# Patient Record
(demographics unavailable — no encounter records)

---

## 2025-01-13 NOTE — ASSESSMENT
[FreeTextEntry1] : Impression: History of iron deficiency anemia with no GI etiology found after extensive workup.  No evidence of iron deficiency currently, states he never took iron supplements.  No GI complaints.  Average risk colorectal cancer.  10-year follow-up for colonoscopy recommended.  Plan: Repeat colonoscopy 2032.

## 2025-01-13 NOTE — HISTORY OF PRESENT ILLNESS
[FreeTextEntry1] : 65-year-old male was seen 3 years ago by Dr. Palomino for iron deficiency.  GI workup including EGD, colonoscopy, small bowel capsule, CAT scan all negative.  Patient states he never took iron supplements.  Recent CBC and iron studies normal.  No GI complaints.  Bowel movements normal.

## 2025-05-15 NOTE — HISTORY OF PRESENT ILLNESS
[FreeTextEntry1] : He is a 65 year-old man who was seen today for ED and distal urethral stricture after TURP.  Nocturia is minimal.  Urinary flow strong.  He self dilates the distal urethra every now and then.  Residual urine today was less than 50 mL.  E. coli UTI was treated in 2024.  In November 2024 PSA was 1.59.  He is using sildenafil 60 mg with good results.    Previous notes: He had developed a distal urethral stricture after undergoing TURP in 2019.  He saw one of our reconstructive surgeons for consideration of repair but he decided to wait.  He had history of slow and interrupted urinary stream, nocturia 2 times and sensation of incomplete bladder emptying. He did not respond to Flomax. He has undergone urodynamics study.

## 2025-05-15 NOTE — ASSESSMENT
[FreeTextEntry1] : He will dilate the distal ureter if needed and when his symptoms change.  He empties bladder well.  PSA level was normal.  UTI previously was treated.  Sildenafil was refilled.  He can follow-up in 6 months to 1 year.  Fito Victoria MD, [facility]  of Urology [address], [address] [place] [address] Tel: [phone] Fax: [phone]

## 2025-05-15 NOTE — PHYSICAL EXAM
[Urethral Meatus] : meatus normal [Penis Abnormality] : normal uncircumcised penis [Urinary Bladder Findings] : the bladder was normal on palpation [Scrotum] : the scrotum was normal [Testes Tenderness] : no tenderness of the testes [Testes Mass (___cm)] : there were no testicular masses [FreeTextEntry1] : Exam done in the past [General Appearance - Well Developed] : well developed [General Appearance - Well Nourished] : well nourished [Normal Appearance] : normal appearance [Well Groomed] : well groomed [] : no respiratory distress [Abdomen Soft] : soft [Abdomen Tenderness] : non-tender [Normal Station and Gait] : the gait and station were normal for the patient's age [Oriented To Time, Place, And Person] : oriented to person, place, and time [Affect] : the affect was normal [Mood] : the mood was normal [Not Anxious] : not anxious

## 2025-06-10 NOTE — HISTORY OF PRESENT ILLNESS
[FreeTextEntry1] : np rash [de-identified] : Referred by: RUPERT Dia DO   Mr. SUDHEER GUNTER  is a 65 year old M here for evaluation of below  #itchy rash on arms, felt a bug bite on 6/4 in the morning noticed a black spot. rash spreading on arms since then  was doing outdoor yardwork in the preceding day

## 2025-06-10 NOTE — ASSESSMENT
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

## 2025-06-10 NOTE — HISTORY OF PRESENT ILLNESS
[FreeTextEntry1] : np rash [de-identified] : Referred by: RUPERT Dia DO   Mr. SUDHEER GUNTER  is a 65 year old M here for evaluation of below  #itchy rash on arms, felt a bug bite on 6/4 in the morning noticed a black spot. rash spreading on arms since then  was doing outdoor yardwork in the preceding day

## 2025-07-08 NOTE — ASSESSMENT
[FreeTextEntry1] : Impression: Remote history of iron deficiency anemia resolved, no evidence of chronic GI blood loss.  Negative GI workup 3 years ago.  No intervention necessary.  Plan: Repeat colonoscopy 2032

## 2025-07-08 NOTE — HISTORY OF PRESENT ILLNESS
[FreeTextEntry1] : Patient told to follow-up with me as he had a recent episode of black stool, formed and quickly resolved, history of iron deficiency anemia with negative GI workup in 2022 including EGD, colonoscopy, VCE.  No longer taking iron.  CBC and iron studies have been normal.  Patient is of average risk for colorectal cancer with recommended follow-up screening colonoscopy after 10 years (2032).

## 2025-07-10 NOTE — HISTORY OF PRESENT ILLNESS
[FreeTextEntry1] : np rash [de-identified] : Referred by: RUPERT Dia DO   Mr. SUDHEER GUNTER  is a 65 year old M here for evaluation of below  #itchy rash on arms - ACD - resolved w/ pred and clobetasol  hx: felt a bug bite on 6/4 in the morning noticed a black spot. rash spreading on arms since then  was doing outdoor yardwork in the preceding day  #itchy scalp and neck > 1 yr, worse at night, untreated.

## 2025-07-10 NOTE — ASSESSMENT
[External notes review: [ enter provider(s) name(s) ] :____] : As part of my evaluation, I have reviewed prior clinical note(s) from provider(s) outside of my group practice. The name(s) are: [unfilled] [FreeTextEntry1] : localized scalp and neck pruritus chronic  uncertain diagnosis of uncertain prognosis  some erythema on the neck, otherwise skin appears normal trial TCS as below fluocinolone solution BID x 2 wks on/2 weeks off Reviewed SE of topical steroids including skin thinning and discoloration and discussed avoidance of prolonged use.  RTC 2 mo

## 2025-07-10 NOTE — HISTORY OF PRESENT ILLNESS
[FreeTextEntry1] : np rash [de-identified] : Referred by: RUPERT Dia DO   Mr. SUDHEER GUNTER  is a 65 year old M here for evaluation of below  #itchy rash on arms - ACD - resolved w/ pred and clobetasol  hx: felt a bug bite on 6/4 in the morning noticed a black spot. rash spreading on arms since then  was doing outdoor yardwork in the preceding day  #itchy scalp and neck > 1 yr, worse at night, untreated.